# Patient Record
Sex: FEMALE | Race: WHITE | URBAN - METROPOLITAN AREA
[De-identification: names, ages, dates, MRNs, and addresses within clinical notes are randomized per-mention and may not be internally consistent; named-entity substitution may affect disease eponyms.]

---

## 2017-11-28 ENCOUNTER — OFFICE VISIT (OUTPATIENT)
Dept: DERMATOLOGY | Age: 37
End: 2017-11-28

## 2017-11-28 DIAGNOSIS — D22.9 BENIGN NEVUS: ICD-10-CM

## 2017-11-28 DIAGNOSIS — D22.9: Primary | ICD-10-CM

## 2017-11-28 DIAGNOSIS — D22.9 NEVUS SPILUS: ICD-10-CM

## 2017-11-28 PROCEDURE — 11300 SHAVE SKIN LESION 0.5 CM/<: CPT | Performed by: DERMATOLOGY

## 2017-11-28 PROCEDURE — 11306 SHAVE SKIN LESION 0.6-1.0 CM: CPT | Performed by: DERMATOLOGY

## 2017-11-28 PROCEDURE — 99202 OFFICE O/P NEW SF 15 MIN: CPT | Performed by: DERMATOLOGY

## 2017-11-28 NOTE — PROGRESS NOTES
[facility]) Dermatology  Valentino Miranda M.D.  [phone]       Radha Mary  1980    40 y.o. female     Date of Visit: 11/28/2017    Chief Complaint:   Chief Complaint   Patient presents with    New Patient    Skin Exam        I was asked to see this patient by Dr. Machuca ref. provider found. History of Present Illness:  1. Total body skin exam    Raised nevus right scalp and mid back-have increased in size. Lesion on back catching on her clothing and becoming traumatized. Patient would like to have these removed. 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 Patient has not noticed any change to size, shape, color    No previous personal or family history of skin cancer. Distant history of tanning bed use. 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 Social History:   Social History     Social History    Marital status: Single     Spouse name: N/A    Number of children: N/A    Years of education: N/A     Occupational History    Not on file. Social History Main Topics    Smoking status: Never Smoker    Smokeless tobacco: Never Used    Alcohol use Yes    Drug use: No    Sexual activity: Not on file     Other Topics Concern    Not on file     Social History Narrative    No narrative on file       Physical Examination       -General: Well-appearing, NAD  Normal affect. Total body skin exam including scalp, face, neck, chest, abdomen, back, bilateral upper extremities, bilateral lower extremities, ocular conjunctiva, external lips, and nails was performed. Underwear area not examined. Scattered on the trunk and extremities are multiple well-defined round and oval symmetric smoothly-bordered uniformly brown macules and papules.   1.2 cm tan plaque with multiple hyperpigmented papules within mid lower abdomen-nevus spilus  9 mm raised flesh-colored papule right scalp. 5 mm raised tan papule mid back-irritated nevi              Assessment and Plan     1. Irritated nevus of multiple sites -Right scalp and mid spinal back--Verbal consent obtained after risks (infection, bleeding, scar), benefits and alternatives explained. -Area(s) to be shaved were marked with a surgical pen. Site(s) were cleansed with alcohol. Local anesthesia achieved with 1% lidocaine with epinephrine/sodium bicarbonate. Shave lesion performed with a razor blade. Hemostasis was achieved with aluminum chloride. The wound(s) were dressed with petrolatum and covered with a bandage. Specimen(s) sent to pathology. Pt educated re: risk of bleeding, infection, scar and wound care instructions. 2. Benign nevus - Benign acquired melanocytic nevi  -Recommend monthly self skin exams   -Educated regarding the ABCDEs of melanoma detection   -Call for any new/changing moles or concerning lesions  -Reviewed sun protective behavior -- sun avoidance during the peak hours of the day, sun-protective clothing (including hat and sunglasses), sunscreen use (water resistant, broad spectrum, SPF at least 30, need for reapplication every 2 to 3 hours), avoidance of tanning beds      3.  Nevus spilus -Monitor for change

## 2017-11-30 ENCOUNTER — TELEPHONE (OUTPATIENT)
Dept: DERMATOLOGY | Age: 37
End: 2017-11-30

## 2017-11-30 NOTE — TELEPHONE ENCOUNTER
Reviewed results of the biopsy with the patient's  Leatha Costa Date of biopsy: 11/28/17  Site of biopsy: Right mid spinal back  Result: Dermal nevus    Plan: No further treatment    The patient's  expressed understanding of the plan. Reviewed results of the biopsy with the patient's  Zack    Date of biopsy: 11/28/17  Site of biopsy: Right scalp  Result: Dermal nevus    Plan: No further treatment    The patient's  expressed understanding of the plan.